# Patient Record
Sex: MALE | Race: WHITE
[De-identification: names, ages, dates, MRNs, and addresses within clinical notes are randomized per-mention and may not be internally consistent; named-entity substitution may affect disease eponyms.]

---

## 2019-12-24 ENCOUNTER — HOSPITAL ENCOUNTER (OUTPATIENT)
Dept: HOSPITAL 47 - RADNMMAIN | Age: 59
Discharge: HOME | End: 2019-12-24
Attending: INTERNAL MEDICINE
Payer: COMMERCIAL

## 2019-12-24 DIAGNOSIS — R06.02: Primary | ICD-10-CM

## 2019-12-24 PROCEDURE — 71046 X-RAY EXAM CHEST 2 VIEWS: CPT

## 2019-12-24 PROCEDURE — 78582 LUNG VENTILAT&PERFUS IMAGING: CPT

## 2019-12-24 NOTE — XR
EXAMINATION TYPE: XR chest 2V

 

DATE OF EXAM: 12/24/2019

 

COMPARISON: NONE

 

HISTORY: Dyspnea.

 

TECHNIQUE:  Frontal and lateral views of the chest are obtained.

 

FINDINGS: Low lung volumes are identified may be related to patient's large body habitus. Slight even
tration anterior aspect right hemidiaphragm There is no focal air space opacity, pleural effusion, or
 pneumothorax seen.  The cardiac silhouette size is upper limits of normal.   The osseous structures 
are intact.

 

IMPRESSION:  Low lung volumes without suspicious acute pulmonary process.

## 2020-01-13 ENCOUNTER — HOSPITAL ENCOUNTER (OUTPATIENT)
Dept: HOSPITAL 47 - RADECHMAIN | Age: 60
Discharge: HOME | End: 2020-01-13
Attending: NURSE PRACTITIONER
Payer: COMMERCIAL

## 2020-01-13 DIAGNOSIS — E66.01: ICD-10-CM

## 2020-01-13 DIAGNOSIS — I08.1: Primary | ICD-10-CM

## 2020-01-13 PROCEDURE — 93306 TTE W/DOPPLER COMPLETE: CPT

## 2020-01-13 NOTE — ECHOF
Referral Reason:R06.02 SOB



MEASUREMENTS

--------

HEIGHT: 162.6 cm

WEIGHT: 124.7 kg

BP: 

RVIDd:   3.7 cm     (< 3.3)

IVSd:   1.4 cm     (0.6 - 1.1)

LVIDd:   4.5 cm     (3.9 - 5.3)

LVPWd:   1.4 cm     (0.6 - 1.1)

IVSs:   1.9 cm

LVIDs:   3.1 cm

LVPWs:   1.2 cm

LA Diam:   4.1 cm     (2.7 - 3.8)

LAESV Index (A-L):   32.65 ml/m

Ao Diam:   3.9 cm     (2.0 - 3.7)

AV Cusp:   1.9 cm     (1.5 - 2.6)

LA Diam:   4.0 cm     (2.7 - 3.8)

MV EXCURSION:   20.477 mm     (> 18.000)

MV EF SLOPE:   79 mm/s     (70 - 150)

EPSS:   0.3 cm

MV E Donny:   0.09 m/s

MV DecT:   221 ms

MV A Donny:   0.06 m/s

MV E/A Ratio:   1.50 

RAP:   5.00 mmHg

RVSP:   19.26 mmHg







FINDINGS

--------

Sinus rhythm.

Morbid Obesity

The left ventricular size is normal.   There is mild concentric left ventricular hypertrophy.   Overa
ll left ventricular systolic function is normal with, an EF between 55 - 60 %.   The diastolic fillin
g pattern is normal for the age of the patient 1.63.

The right ventricle is normal in size.

The left atrium is mildly dilated.   LA is midly dilated 29-33ml/m2.

The right atrial size is normal.

There is mild aortic valve sclerosis.   There is no evidence of aortic regurgitation.

Mild mitral annular calcification present.   Mild mitral regurgitation is present.

Mild tricuspid regurgitation present.   Right ventricular systolic pressure is normal at < 35 mmHg.  
 There is no evidence of pulmonary hypertension.

There is no pulmonic regurgitation present.

The aortic root size is normal.

Echo free space represents a pericardial fat pad.



CONCLUSIONS

--------

1. Sinus rhythm.

2. Morbid Obesity

3. The left ventricular size is normal.

4. There is mild concentric left ventricular hypertrophy.

5. Overall left ventricular systolic function is normal with, an EF between 55 - 60 %.

6. The diastolic filling pattern is normal for the age of the patient 1.63

7. The right ventricle is normal in size.

8. The left atrium is mildly dilated.

9. LA is midly dilated 29-33ml/m2.

10. The right atrial size is normal.

11. There is mild aortic valve sclerosis.

12. Mild mitral annular calcification present.

13. Mild mitral regurgitation is present.

14. Mild tricuspid regurgitation present.

15. Right ventricular systolic pressure is normal at < 35 mmHg.

16. There is no evidence of pulmonary hypertension.

17. There is no pulmonic regurgitation present.

18. The aortic root size is normal.

19. Echo free space represents a pericardial fat pad.





SONOGRAPHER: Teodora Waters RDCS